# Patient Record
Sex: FEMALE | Race: BLACK OR AFRICAN AMERICAN | NOT HISPANIC OR LATINO | URBAN - METROPOLITAN AREA
[De-identification: names, ages, dates, MRNs, and addresses within clinical notes are randomized per-mention and may not be internally consistent; named-entity substitution may affect disease eponyms.]

---

## 2019-01-03 ENCOUNTER — EMERGENCY (EMERGENCY)
Facility: HOSPITAL | Age: 29
LOS: 1 days | Discharge: HOME | End: 2019-01-03
Attending: EMERGENCY MEDICINE
Payer: COMMERCIAL

## 2019-01-03 VITALS
HEART RATE: 61 BPM | OXYGEN SATURATION: 100 % | TEMPERATURE: 98 F | RESPIRATION RATE: 16 BRPM | SYSTOLIC BLOOD PRESSURE: 128 MMHG | DIASTOLIC BLOOD PRESSURE: 85 MMHG

## 2019-01-03 VITALS
TEMPERATURE: 98 F | SYSTOLIC BLOOD PRESSURE: 125 MMHG | DIASTOLIC BLOOD PRESSURE: 88 MMHG | HEART RATE: 67 BPM | WEIGHT: 123.9 LBS | HEIGHT: 68 IN | RESPIRATION RATE: 16 BRPM | OXYGEN SATURATION: 98 %

## 2019-01-03 DIAGNOSIS — Z98.890 OTHER SPECIFIED POSTPROCEDURAL STATES: Chronic | ICD-10-CM

## 2019-01-03 LAB
ALBUMIN SERPL ELPH-MCNC: 4.5 G/DL — SIGNIFICANT CHANGE UP (ref 3.3–5)
ALP SERPL-CCNC: 49 U/L — SIGNIFICANT CHANGE UP (ref 40–120)
ALT FLD-CCNC: 9 U/L — LOW (ref 10–45)
ANION GAP SERPL CALC-SCNC: 10 MMOL/L — SIGNIFICANT CHANGE UP (ref 5–17)
APPEARANCE UR: ABNORMAL
AST SERPL-CCNC: 23 U/L — SIGNIFICANT CHANGE UP (ref 10–40)
BACTERIA # UR AUTO: ABNORMAL
BILIRUB SERPL-MCNC: 0.8 MG/DL — SIGNIFICANT CHANGE UP (ref 0.2–1.2)
BILIRUB UR-MCNC: NEGATIVE — SIGNIFICANT CHANGE UP
BUN SERPL-MCNC: 13 MG/DL — SIGNIFICANT CHANGE UP (ref 7–23)
CALCIUM SERPL-MCNC: 9.7 MG/DL — SIGNIFICANT CHANGE UP (ref 8.4–10.5)
CHLORIDE SERPL-SCNC: 103 MMOL/L — SIGNIFICANT CHANGE UP (ref 96–108)
CO2 SERPL-SCNC: 23 MMOL/L — SIGNIFICANT CHANGE UP (ref 22–31)
COLOR SPEC: YELLOW — SIGNIFICANT CHANGE UP
CREAT SERPL-MCNC: 0.83 MG/DL — SIGNIFICANT CHANGE UP (ref 0.5–1.3)
DIFF PNL FLD: NEGATIVE — SIGNIFICANT CHANGE UP
EPI CELLS # UR: 21 /HPF — HIGH
GLUCOSE SERPL-MCNC: 94 MG/DL — SIGNIFICANT CHANGE UP (ref 70–99)
GLUCOSE UR QL: NEGATIVE — SIGNIFICANT CHANGE UP
GRAN CASTS # UR COMP ASSIST: 2 /LPF — SIGNIFICANT CHANGE UP
HCT VFR BLD CALC: 40.4 % — SIGNIFICANT CHANGE UP (ref 34.5–45)
HGB BLD-MCNC: 13.4 G/DL — SIGNIFICANT CHANGE UP (ref 11.5–15.5)
HYALINE CASTS # UR AUTO: 13 /LPF — HIGH (ref 0–2)
KETONES UR-MCNC: NEGATIVE — SIGNIFICANT CHANGE UP
LEUKOCYTE ESTERASE UR-ACNC: ABNORMAL
MCHC RBC-ENTMCNC: 32.3 PG — SIGNIFICANT CHANGE UP (ref 27–34)
MCHC RBC-ENTMCNC: 33.2 GM/DL — SIGNIFICANT CHANGE UP (ref 32–36)
MCV RBC AUTO: 97.2 FL — SIGNIFICANT CHANGE UP (ref 80–100)
NITRITE UR-MCNC: NEGATIVE — SIGNIFICANT CHANGE UP
PH UR: 6.5 — SIGNIFICANT CHANGE UP (ref 5–8)
PLATELET # BLD AUTO: 141 K/UL — LOW (ref 150–400)
POTASSIUM SERPL-MCNC: 3.6 MMOL/L — SIGNIFICANT CHANGE UP (ref 3.5–5.3)
POTASSIUM SERPL-SCNC: 3.6 MMOL/L — SIGNIFICANT CHANGE UP (ref 3.5–5.3)
PROT SERPL-MCNC: 8.6 G/DL — HIGH (ref 6–8.3)
PROT UR-MCNC: ABNORMAL
RBC # BLD: 4.16 M/UL — SIGNIFICANT CHANGE UP (ref 3.8–5.2)
RBC # FLD: 11 % — SIGNIFICANT CHANGE UP (ref 10.3–14.5)
RBC CASTS # UR COMP ASSIST: 5 /HPF — HIGH (ref 0–4)
SODIUM SERPL-SCNC: 136 MMOL/L — SIGNIFICANT CHANGE UP (ref 135–145)
SP GR SPEC: 1.03 — HIGH (ref 1.01–1.02)
UROBILINOGEN FLD QL: ABNORMAL
WBC # BLD: 5.5 K/UL — SIGNIFICANT CHANGE UP (ref 3.8–10.5)
WBC # FLD AUTO: 5.5 K/UL — SIGNIFICANT CHANGE UP (ref 3.8–10.5)
WBC UR QL: 33 /HPF — HIGH (ref 0–5)

## 2019-01-03 PROCEDURE — 70486 CT MAXILLOFACIAL W/O DYE: CPT | Mod: 26

## 2019-01-03 PROCEDURE — 96375 TX/PRO/DX INJ NEW DRUG ADDON: CPT

## 2019-01-03 PROCEDURE — 85027 COMPLETE CBC AUTOMATED: CPT

## 2019-01-03 PROCEDURE — 80053 COMPREHEN METABOLIC PANEL: CPT

## 2019-01-03 PROCEDURE — 99284 EMERGENCY DEPT VISIT MOD MDM: CPT | Mod: 25

## 2019-01-03 PROCEDURE — 70486 CT MAXILLOFACIAL W/O DYE: CPT

## 2019-01-03 PROCEDURE — 96374 THER/PROPH/DIAG INJ IV PUSH: CPT

## 2019-01-03 PROCEDURE — 70450 CT HEAD/BRAIN W/O DYE: CPT

## 2019-01-03 PROCEDURE — 81001 URINALYSIS AUTO W/SCOPE: CPT

## 2019-01-03 PROCEDURE — 99284 EMERGENCY DEPT VISIT MOD MDM: CPT

## 2019-01-03 PROCEDURE — 76377 3D RENDER W/INTRP POSTPROCES: CPT

## 2019-01-03 PROCEDURE — 76377 3D RENDER W/INTRP POSTPROCES: CPT | Mod: 26

## 2019-01-03 PROCEDURE — 70450 CT HEAD/BRAIN W/O DYE: CPT | Mod: 26

## 2019-01-03 RX ORDER — METOCLOPRAMIDE HCL 10 MG
10 TABLET ORAL ONCE
Qty: 0 | Refills: 0 | Status: COMPLETED | OUTPATIENT
Start: 2019-01-03 | End: 2019-01-03

## 2019-01-03 RX ORDER — ACETAMINOPHEN 500 MG
1000 TABLET ORAL ONCE
Qty: 0 | Refills: 0 | Status: COMPLETED | OUTPATIENT
Start: 2019-01-03 | End: 2019-01-03

## 2019-01-03 RX ORDER — SODIUM CHLORIDE 9 MG/ML
3 INJECTION INTRAMUSCULAR; INTRAVENOUS; SUBCUTANEOUS ONCE
Qty: 0 | Refills: 0 | Status: COMPLETED | OUTPATIENT
Start: 2019-01-03 | End: 2019-01-03

## 2019-01-03 RX ORDER — ONDANSETRON 8 MG/1
4 TABLET, FILM COATED ORAL ONCE
Qty: 0 | Refills: 0 | Status: COMPLETED | OUTPATIENT
Start: 2019-01-03 | End: 2019-01-03

## 2019-01-03 RX ADMIN — Medication 10 MILLIGRAM(S): at 10:03

## 2019-01-03 RX ADMIN — Medication 400 MILLIGRAM(S): at 10:03

## 2019-01-03 RX ADMIN — SODIUM CHLORIDE 3 MILLILITER(S): 9 INJECTION INTRAMUSCULAR; INTRAVENOUS; SUBCUTANEOUS at 10:03

## 2019-01-03 RX ADMIN — ONDANSETRON 4 MILLIGRAM(S): 8 TABLET, FILM COATED ORAL at 10:03

## 2019-01-03 NOTE — ED PROVIDER NOTE - NSFOLLOWUPCLINICS_GEN_ALL_ED_FT
[facility]  Neurology  [address] [place] [address] 3rd [place] [address]  Phone: [phone]  Fax:   Follow Up Time:

## 2019-01-03 NOTE — ED PROVIDER NOTE - PLAN OF CARE
Follow up with your Primary Care Physician within the next 2-3 days  Bring a copy of your test results with you to your appointment  Continue your current medication regimen  Return to the Emergency Room if you experience new or worsening symptoms  Patient to take Tylenol 975mg every 6 hours as needed for headache and alternate with NSAIDs such as ibuprofen, aleve, advil 600mg every 6-8 hours as needed in which patient should take with food  Patient to follow up with neurology clinic provided to you within 1 week of discharge if symptoms persist Follow up with your Primary Care Physician within the next 2-3 days  Bring a copy of your test results with you to your appointment  Continue your current medication regimen  Return to the Emergency Room if you experience new or worsening symptoms  Patient to take Tylenol 975mg every 6 hours as needed for headache and alternate with NSAIDs such as ibuprofen, aleve, advil 600mg every 6-8 hours as needed in which patient should take with food  Patient to follow up with neurology clinic provided to you within 1 week of discharge if symptoms persist  Patient to follow up with plastic surgeon Dr. Imelda Cohen within 72 hours for follow up regarding nasal fracture ([address] [facility] [address] [facility] [address], [phone])  Please do not blow your nose until you follow up with plastic surgery

## 2019-01-03 NOTE — ED ADULT NURSE REASSESSMENT NOTE - NS ED NURSE REASSESS COMMENT FT1
explained in detail and highlighted discharge instructions and went over with pt and mother  Pt d/c home w/ written and verbal instructions. Pt verbalized understanding. IV d/c. No s&s of infection/infiltration. VSS. Pt states " I am ready to go home".

## 2019-01-03 NOTE — ED ADULT TRIAGE NOTE - CHIEF COMPLAINT QUOTE
C/o HA for the past 2 months. Two days ago hit Head on tub, no LOC. Presents c/o increased HA, denies blurry vision.

## 2019-01-03 NOTE — ED ADULT NURSE NOTE - OBJECTIVE STATEMENT
28 y.o female presents to the ed c.o headache and head injury. Patient has a history of migraines since she was 10 years old, no other pmhx. Patient states she has had her latest migraine for two months. Two days ago she was cleaning the bathroom tub, fell and hit her nose/right eye against the tub. denies LOC, sustained laceration to her nose and bruising to her right eye. Patient sates her headache has now worsened. She has been taking Advil daily to function. had one episode of vomiting yesterday and believes she has had a fever for two days, did not measure it at home. Denies travel/ other pmhx, drug use. denies chest pain/sob/abdominal pain/diarrhea. Patient c.o nausea, photophobia, 3/10 headache (took Advil prior to arrival). patient has not been to a neurologist. patient has swelling to the right eye lid without visual acuity damage. no active bleeding from laceration. patient is neuralgically intact, ambulating well on her own, answering questions appropriately. well appearing. Patient undressed and placed into gown, call bell in hand and side rails up for safety. warm blanket provided, vital signs stable, pt in no acute distress.

## 2019-01-03 NOTE — ED PROVIDER NOTE - NSFOLLOWUPINSTRUCTIONS_ED_ALL_ED_FT
Follow up with your Primary Care Physician within the next 2-3 days  Bring a copy of your test results with you to your appointment  Continue your current medication regimen  Return to the Emergency Room if you experience new or worsening symptoms  Patient to take Tylenol 975mg every 6 hours as needed for headache and alternate with NSAIDs such as ibuprofen, aleve, advil 600mg every 6-8 hours as needed in which patient should take with food  Patient to follow up with neurology clinic provided to you within 1 week of discharge if symptoms persist  Patient to follow up with plastic surgeon Dr. Imelda Cohen within 72 hours for follow up regarding nasal fracture ([address] [facility] [address] [facility] [address], [phone])  Please do not blow your nose until you follow up with plastic surgery

## 2019-01-03 NOTE — ED PROVIDER NOTE - CARE PLAN
Principal Discharge DX:	Headache  Assessment and plan of treatment:	Follow up with your Primary Care Physician within the next 2-3 days  Bring a copy of your test results with you to your appointment  Continue your current medication regimen  Return to the Emergency Room if you experience new or worsening symptoms  Patient to take Tylenol 975mg every 6 hours as needed for headache and alternate with NSAIDs such as ibuprofen, aleve, advil 600mg every 6-8 hours as needed in which patient should take with food  Patient to follow up with neurology clinic provided to you within 1 week of discharge if symptoms persist Principal Discharge DX:	Headache  Assessment and plan of treatment:	Follow up with your Primary Care Physician within the next 2-3 days  Bring a copy of your test results with you to your appointment  Continue your current medication regimen  Return to the Emergency Room if you experience new or worsening symptoms  Patient to take Tylenol 975mg every 6 hours as needed for headache and alternate with NSAIDs such as ibuprofen, aleve, advil 600mg every 6-8 hours as needed in which patient should take with food  Patient to follow up with neurology clinic provided to you within 1 week of discharge if symptoms persist  Patient to follow up with plastic surgeon Dr. Imelda Cohen within 72 hours for follow up regarding nasal fracture ([address] [facility] [address] [facility] [address], [phone])  Please do not blow your nose until you follow up with plastic surgery

## 2019-01-03 NOTE — ED PROVIDER NOTE - CARE PROVIDER_API CALL
Imelda Cohen (MD), Surgery  [address] [facility]  [address]  [place] [address]  Phone: [phone]  Fax: [phone]

## 2019-01-03 NOTE — ED PROVIDER NOTE - OBJECTIVE STATEMENT
Private Physician Malia Saxena (Pcp/Selman)  28ty female pmh Migrane mcarthur x age 10 approx, Occurred roughly weekly, Had worsened in college. Pt comes to ed complains of two days ago slipped struck head/nose. Pt complains of headaches past two months "every morning" with min response to advil. Nausea vomiting, x1 yesterday. Tmax/tactile. No cough, runny nose. abd pain, Not pregnant. NO dm, HTN,HLD,cancer,cva,mi,travel.habits,epixtasis. Now 2/10. Had taken advil tba. Frontal/behind eyes. Has not followed up with neurologist.

## 2019-01-03 NOTE — ED ADULT NURSE NOTE - NSIMPLEMENTINTERV_GEN_ALL_ED
Implemented All Universal Safety Interventions:  [place] to call system. Call bell, personal items and telephone within reach. Instruct patient to call for assistance. Room bathroom lighting operational. Non-slip footwear when patient is off stretcher. Physically safe environment: no spills, clutter or unnecessary equipment. Stretcher in lowest position, wheels locked, appropriate side rails in place.

## 2019-01-03 NOTE — ED PROVIDER NOTE - PROGRESS NOTE DETAILS
CANDIS Osborn: patient symptoms improved with medications now just experiencing right facial pain. Patient stated she is not experiencing urinary complaints therefore will hold treatment +UA. Patient going to CT

## 2019-01-03 NOTE — ED PROVIDER NOTE - MEDICAL DECISION MAKING DETAILS
PT with chronic migrane ha pw head trauma check ct orbit/head, treat ha, check labs reassess  Pepe Tillman MD, Facep

## 2019-01-03 NOTE — ED ADULT NURSE NOTE - CHPI ED NUR SYMPTOMS NEG
no change in level of consciousness/no confusion/no dizziness/no numbness/no weakness/no blurred vision

## 2019-01-03 NOTE — ED PROVIDER NOTE - CHPI ED SYMPTOMS NEG
no blurred vision/no seizure/no confusion/no loss of consciousness/no weakness/no change in level of consciousness/no syncope

## 2021-01-11 NOTE — ED ADULT NURSE NOTE - NS ED NOTE ABUSE SUSPICION NEGLECT YN
[facility]  [address] [facility] [address]  Phone: [phone]  Fax: [phone]                                    Simone Daniels   MRN: [id number]    Department: [facility]   Date of Visit: 1/11/2021           After Visit Summary Signature Page    I have received my discharge instructions, and my questions have been answered. I have discussed any challenges I see with this plan with the nurse or doctor.    ..........................................................................................................................................  Patient/Patient Representative Signature      ..........................................................................................................................................  Patient Representative Print Name and Relationship to Patient    ..................................................               ................................................  Date                                   Time    ..........................................................................................................................................  Reviewed by Signature/Title    ...................................................              ..............................................  Date                                               Time          22EPIC Rev 08/18        No

## 2022-08-17 NOTE — ED PROVIDER NOTE - CROS ED NEURO ALL NEG
8/22/2022 12:49 PM    Ms. Lemonijankatisabel 78      Dear Ms. Kyle Smith missed you! Please call our office at [phone] and schedule a follow up appointment for your continued care. I will be unable to continue refilling your medication until you have been seen for an appointment. Look forward to seeing you soon.          Sincerely,      Shawanda Daily MD - - -